# Patient Record
Sex: MALE | Race: WHITE | Employment: UNEMPLOYED
[De-identification: names, ages, dates, MRNs, and addresses within clinical notes are randomized per-mention and may not be internally consistent; named-entity substitution may affect disease eponyms.]

---
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
## 2023-12-29 ENCOUNTER — HOSPITAL ENCOUNTER (EMERGENCY)
Age: 23
Discharge: HOME | End: 2023-12-30
Attending: EMERGENCY MEDICINE
Payer: COMMERCIAL

## 2023-12-29 VITALS — BODY MASS INDEX: 37.56 KG/M2 | HEIGHT: 72 IN | WEIGHT: 277.3 LBS

## 2023-12-29 DIAGNOSIS — S51.012A ELBOW LACERATION, LEFT, INITIAL ENCOUNTER: Primary | ICD-10-CM

## 2023-12-29 PROCEDURE — 96372 THER/PROPH/DIAG INJ SC/IM: CPT

## 2023-12-29 PROCEDURE — 12002 RPR S/N/AX/GEN/TRNK2.6-7.5CM: CPT

## 2023-12-29 PROCEDURE — 99284 EMERGENCY DEPT VISIT MOD MDM: CPT

## 2023-12-30 PROCEDURE — 90471 IMMUNIZATION ADMIN: CPT | Performed by: EMERGENCY MEDICINE

## 2023-12-30 PROCEDURE — 90714 TD VACC NO PRESV 7 YRS+ IM: CPT | Performed by: EMERGENCY MEDICINE

## 2023-12-30 PROCEDURE — 6360000002 HC RX W HCPCS: Performed by: EMERGENCY MEDICINE

## 2023-12-30 PROCEDURE — 96372 THER/PROPH/DIAG INJ SC/IM: CPT

## 2023-12-30 RX ORDER — DIPHENHYDRAMINE HYDROCHLORIDE 50 MG/ML
50 INJECTION INTRAMUSCULAR; INTRAVENOUS ONCE
Status: COMPLETED | OUTPATIENT
Start: 2023-12-30 | End: 2023-12-30

## 2023-12-30 RX ORDER — LORAZEPAM 2 MG/ML
2 INJECTION INTRAMUSCULAR ONCE
Status: COMPLETED | OUTPATIENT
Start: 2023-12-30 | End: 2023-12-30

## 2023-12-30 RX ORDER — LIDOCAINE HYDROCHLORIDE 10 MG/ML
5 INJECTION, SOLUTION INFILTRATION; PERINEURAL ONCE
Status: DISCONTINUED | OUTPATIENT
Start: 2023-12-30 | End: 2023-12-30 | Stop reason: HOSPADM

## 2023-12-30 RX ADMIN — LORAZEPAM 2 MG: 2 INJECTION, SOLUTION INTRAMUSCULAR; INTRAVENOUS at 01:40

## 2023-12-30 RX ADMIN — DIPHENHYDRAMINE HYDROCHLORIDE 50 MG: 50 INJECTION INTRAMUSCULAR; INTRAVENOUS at 01:40

## 2023-12-30 RX ADMIN — CLOSTRIDIUM TETANI TOXOID ANTIGEN (FORMALDEHYDE INACTIVATED) AND CORYNEBACTERIUM DIPHTHERIAE TOXOID ANTIGEN (FORMALDEHYDE INACTIVATED) 0.5 ML: 5; 2 INJECTION, SUSPENSION INTRAMUSCULAR at 01:41

## 2023-12-30 NOTE — ED NOTES
Pt arrives to the ED with Caregivers for c/o left elbow laceration  Per caregivers he went [facility] [address] [facility]  RR is even and non-labored, NAD noticed at this time

## 2023-12-30 NOTE — ED PROVIDER NOTES
EMERGENCY DEPARTMENT ENCOUNTER    Pt Name: Jah Guerra  MRN: [id number]  Birthdate 2000  Date of evaluation: 12/30/23  CHIEF COMPLAINT       Chief Complaint   Patient presents with    Laceration     HISTORY OF PRESENT ILLNESS   HPI  23-year-old male with a history of nonverbal autism was brought to the ED by group home staff for a left elbow laceration.  Patient frequently gets agitated and sometimes violent, earlier today at the group home he punched a glass window which broke, caused a laceration to his left elbow so he was brought to the ED.  Bleeding was relatively minor and controlled with some pressure but patient is somewhat reluctant to allow other people to touch the area.  No other acute complaints.    REVIEW OF SYSTEMS     Review of Systems   Unable to perform ROS: Patient nonverbal     PASTMEDICAL HISTORY     Past Medical History:   Diagnosis Date    ADHD (attention deficit hyperactivity disorder)     Autism     Cellulitis and abscess of leg     right leg    Dental caries     Eczema      SURGICAL HISTORY       Past Surgical History:   Procedure Laterality Date    DEBRIDEMENT  03/12/2015    Incision and drainage leg right    DENTAL SURGERY  04/12/2017    x1 crown/7 sealants    DENTAL SURGERY N/A 4/12/2017    1 CROWN, 7 SEALENTS performed by Lm Arana MD at [facility] OR    RETROPHYARYNGEAL ABCESS INCISION/DRAIN       CURRENT MEDICATIONS       Discharge Medication List as of 12/30/2023  2:34 AM        CONTINUE these medications which have NOT CHANGED    Details   Cariprazine HCl (VRAYLAR) 6 MG CAPS capsule Take 1 capsule by mouth dailyHistorical Med      hydrocortisone 2.5 % ointment Apply topically 2 times daily., Disp-30 g, R-1, Normal      mupirocin (BACTROBAN) 2 % ointment Apply topically 3 times daily., Disp-30 g, R-1, Normal      triamcinolone (KENALOG) 0.1 % cream Apply topically 2 times daily., Disp-60 g, R-0, Normal      CLONIDINE HCL ER PO Take 2 tablets by mouth every morningHistorical  no mass. Tenderness: There is no abdominal tenderness. There is no right CVA tenderness, left CVA tenderness, guarding or rebound. Musculoskeletal:         General: Normal range of motion. Cervical back: No rigidity. Skin:     General: Skin is warm and dry. Capillary Refill: Capillary refill takes less than 2 seconds. Comments: 3 cm curvilinear laceration to the extensor aspect of the left elbow, down to subcutaneous tissue, some bloody oozing but no pulsatile bleeding, no visible foreign bodies, not well-approximated   Neurological:      General: No focal deficit present. Mental Status: He is alert and oriented to person, place, and time. Psychiatric:         Mood and Affect: Mood normal.         Thought Content: Thought content normal.         MEDICAL DECISION MAKING/ED Course:     - IM benadryl, ativan to facilitate laceration repair  - tetanus booster  - laceration repair    80-year-old male presents ED for evaluation of a laceration to his left elbow after punching glass at his facility. Here in the ER he is well-appearing, no acute distress, exam shows a 3 cm relatively deep laceration to the left extensor elbow. Patient was given some IM medications to help with anxiety and agitation, tetanus was updated, the wound was irrigated with normal saline, repaired with 3 4-0 Vicryl simple interrupted sutures with good approximation. Per facility staff with patient they do not think that we should put any dressing on the area because he has a tendency to pull off and pick at any dressing that is placed. Facility staff given instructions for wound care, return precautions in case of infection, patient tolerated procedure relatively well and was discharged in the ED in stable condition.     DIAGNOSTIC RESULTS       Vitals:    Vitals:    12/29/23 2207   Weight: 125.8 kg (277 lb 4.8 oz)   Height: 1.829 m (6')       The patient was given the following medications while in the emergency

## 2023-12-30 NOTE — DISCHARGE INSTRUCTIONS
Keep the area clean and dry is much as possible. If he develops any signs of infection or any other concerning symptoms bring him back to the ER.

## (undated) DEVICE — MEDI-VAC NON-CONDUCTIVE SUCTION TUBING 7MM X 6.1M (20 FT.) L: Brand: CARDINAL HEALTH

## (undated) DEVICE — PROTECTOR EYE PT SELF ADH NS OPT GRD LF

## (undated) DEVICE — BANDAGE GZ W2XL75IN ST RAYON POLY CNFRM STRTCH LTWT

## (undated) DEVICE — MOUNTXRAY FLM DBL TRNSLUC W/ 2 WIND

## (undated) DEVICE — COVER LT HNDL BLU PLAS

## (undated) DEVICE — COVER,MAYO STAND,STERILE: Brand: MEDLINE

## (undated) DEVICE — TOWEL SURG W16XL26IN WHT NONFENESTRATED ST 4 PER PK

## (undated) DEVICE — MEDI-VAC SUCTION HANDLE REGULAR CAPACITY: Brand: CARDINAL HEALTH

## (undated) DEVICE — DRAPE,REIN 53X77,STERILE: Brand: MEDLINE

## (undated) DEVICE — FILM DENT INTRAORAL IP-21 2 PERIAPICAL POLYSOFT INSIGHT

## (undated) DEVICE — GAUZE,SPONGE,4"X4",16PLY,XRAY,STRL,LF: Brand: MEDLINE

## (undated) DEVICE — 3M™ COBAN™ NL STERILE NON-LATEX SELF-ADHERENT WRAP, 2084S, 4 IN X 5 YD (10 CM X 4,5 M), 18 ROLLS/CASE: Brand: 3M™ COBAN™